# Patient Record
Sex: MALE | Race: WHITE | NOT HISPANIC OR LATINO | URBAN - METROPOLITAN AREA
[De-identification: names, ages, dates, MRNs, and addresses within clinical notes are randomized per-mention and may not be internally consistent; named-entity substitution may affect disease eponyms.]

---

## 2018-08-22 ENCOUNTER — EMERGENCY (EMERGENCY)
Facility: HOSPITAL | Age: 63
LOS: 1 days | Discharge: HOME | End: 2018-08-22
Attending: EMERGENCY MEDICINE | Admitting: EMERGENCY MEDICINE
Payer: COMMERCIAL

## 2018-08-22 VITALS
DIASTOLIC BLOOD PRESSURE: 93 MMHG | RESPIRATION RATE: 18 BRPM | SYSTOLIC BLOOD PRESSURE: 194 MMHG | OXYGEN SATURATION: 99 % | HEART RATE: 70 BPM | TEMPERATURE: 99 F

## 2018-08-22 VITALS
DIASTOLIC BLOOD PRESSURE: 126 MMHG | OXYGEN SATURATION: 100 % | SYSTOLIC BLOOD PRESSURE: 198 MMHG | HEART RATE: 96 BPM | TEMPERATURE: 99 F | RESPIRATION RATE: 16 BRPM

## 2018-08-22 LAB
ALBUMIN SERPL ELPH-MCNC: 4.9 G/DL — SIGNIFICANT CHANGE UP (ref 3.3–5)
ALP SERPL-CCNC: 61 U/L — SIGNIFICANT CHANGE UP (ref 40–120)
ALT FLD-CCNC: 16 U/L — SIGNIFICANT CHANGE UP (ref 4–41)
AST SERPL-CCNC: 19 U/L — SIGNIFICANT CHANGE UP (ref 4–40)
BASOPHILS # BLD AUTO: 0.03 K/UL — SIGNIFICANT CHANGE UP (ref 0–0.2)
BASOPHILS NFR BLD AUTO: 0.3 % — SIGNIFICANT CHANGE UP (ref 0–2)
BILIRUB SERPL-MCNC: 0.6 MG/DL — SIGNIFICANT CHANGE UP (ref 0.2–1.2)
BUN SERPL-MCNC: 14 MG/DL — SIGNIFICANT CHANGE UP (ref 7–23)
CALCIUM SERPL-MCNC: 9.9 MG/DL — SIGNIFICANT CHANGE UP (ref 8.4–10.5)
CHLORIDE SERPL-SCNC: 100 MMOL/L — SIGNIFICANT CHANGE UP (ref 98–107)
CO2 SERPL-SCNC: 25 MMOL/L — SIGNIFICANT CHANGE UP (ref 22–31)
CREAT SERPL-MCNC: 1.25 MG/DL — SIGNIFICANT CHANGE UP (ref 0.5–1.3)
EOSINOPHIL # BLD AUTO: 0.02 K/UL — SIGNIFICANT CHANGE UP (ref 0–0.5)
EOSINOPHIL NFR BLD AUTO: 0.2 % — SIGNIFICANT CHANGE UP (ref 0–6)
GLUCOSE SERPL-MCNC: 96 MG/DL — SIGNIFICANT CHANGE UP (ref 70–99)
HCT VFR BLD CALC: 47.8 % — SIGNIFICANT CHANGE UP (ref 39–50)
HGB BLD-MCNC: 16.5 G/DL — SIGNIFICANT CHANGE UP (ref 13–17)
IMM GRANULOCYTES # BLD AUTO: 0.03 # — SIGNIFICANT CHANGE UP
IMM GRANULOCYTES NFR BLD AUTO: 0.3 % — SIGNIFICANT CHANGE UP (ref 0–1.5)
LYMPHOCYTES # BLD AUTO: 1.51 K/UL — SIGNIFICANT CHANGE UP (ref 1–3.3)
LYMPHOCYTES # BLD AUTO: 15.4 % — SIGNIFICANT CHANGE UP (ref 13–44)
MCHC RBC-ENTMCNC: 30.4 PG — SIGNIFICANT CHANGE UP (ref 27–34)
MCHC RBC-ENTMCNC: 34.5 % — SIGNIFICANT CHANGE UP (ref 32–36)
MCV RBC AUTO: 88.2 FL — SIGNIFICANT CHANGE UP (ref 80–100)
MONOCYTES # BLD AUTO: 0.54 K/UL — SIGNIFICANT CHANGE UP (ref 0–0.9)
MONOCYTES NFR BLD AUTO: 5.5 % — SIGNIFICANT CHANGE UP (ref 2–14)
NEUTROPHILS # BLD AUTO: 7.66 K/UL — HIGH (ref 1.8–7.4)
NEUTROPHILS NFR BLD AUTO: 78.3 % — HIGH (ref 43–77)
NRBC # FLD: 0 — SIGNIFICANT CHANGE UP
PLATELET # BLD AUTO: 251 K/UL — SIGNIFICANT CHANGE UP (ref 150–400)
PMV BLD: 10.5 FL — SIGNIFICANT CHANGE UP (ref 7–13)
POTASSIUM SERPL-MCNC: 4 MMOL/L — SIGNIFICANT CHANGE UP (ref 3.5–5.3)
POTASSIUM SERPL-SCNC: 4 MMOL/L — SIGNIFICANT CHANGE UP (ref 3.5–5.3)
PROT SERPL-MCNC: 8.7 G/DL — HIGH (ref 6–8.3)
RBC # BLD: 5.42 M/UL — SIGNIFICANT CHANGE UP (ref 4.2–5.8)
RBC # FLD: 12.9 % — SIGNIFICANT CHANGE UP (ref 10.3–14.5)
SODIUM SERPL-SCNC: 140 MMOL/L — SIGNIFICANT CHANGE UP (ref 135–145)
WBC # BLD: 9.79 K/UL — SIGNIFICANT CHANGE UP (ref 3.8–10.5)
WBC # FLD AUTO: 9.79 K/UL — SIGNIFICANT CHANGE UP (ref 3.8–10.5)

## 2018-08-22 PROCEDURE — 99284 EMERGENCY DEPT VISIT MOD MDM: CPT

## 2018-08-22 RX ORDER — ACETAMINOPHEN 500 MG
650 TABLET ORAL ONCE
Qty: 0 | Refills: 0 | Status: COMPLETED | OUTPATIENT
Start: 2018-08-22 | End: 2018-08-22

## 2018-08-22 RX ORDER — LOSARTAN POTASSIUM 100 MG/1
50 TABLET, FILM COATED ORAL ONCE
Qty: 0 | Refills: 0 | Status: COMPLETED | OUTPATIENT
Start: 2018-08-22 | End: 2018-08-22

## 2018-08-22 RX ORDER — LOSARTAN POTASSIUM 100 MG/1
1 TABLET, FILM COATED ORAL
Qty: 7 | Refills: 0 | OUTPATIENT
Start: 2018-08-22 | End: 2018-08-28

## 2018-08-22 RX ADMIN — Medication 650 MILLIGRAM(S): at 18:26

## 2018-08-22 RX ADMIN — LOSARTAN POTASSIUM 50 MILLIGRAM(S): 100 TABLET, FILM COATED ORAL at 18:25

## 2018-08-22 NOTE — ED PROVIDER NOTE - PLAN OF CARE
1. Follow up with your primary care physician within 2-3days for reevaluation.  2.  Return to the Emergency Department for worsening, progressive or any other concerning symptoms.  3.  Take medications as prescribed.

## 2018-08-22 NOTE — ED ADULT NURSE NOTE - OBJECTIVE STATEMENT
Mihir RN: patient is a 62 y/o male a&ox4 presenting from urgent care with a c/c of high BP, sinua HA, ringing in left ear and pain in left eye.  Despite what is written in triage note patient is denying neck pain.  Patient reports seeking care at Urgent Care today due to sinus HA and was advised to seek care in the ED due to elevated BP.  At present patient is a-focal, GCS of 15, denies visual disturbance/blurry vision/diplopia.  Denies hx of medical problems denies taking medications daily.  Denies SOB, CP, N/V/D, fevers/chills, abd pain, GI/ symptoms, 18 gauge PIV placed in right ac, patient in nad, waiting to be seen by MD.

## 2018-08-22 NOTE — ED PROVIDER NOTE - CARE PLAN
Principal Discharge DX:	HTN (hypertension)  Assessment and plan of treatment:	1. Follow up with your primary care physician within 2-3days for reevaluation.  2.  Return to the Emergency Department for worsening, progressive or any other concerning symptoms.  3.  Take medications as prescribed.

## 2018-08-22 NOTE — ED ADULT TRIAGE NOTE - CHIEF COMPLAINT QUOTE
Sent from urgent care for high BP. States he went there c/o sinus pressure, left ear ringing and neck pain x 1 wk. Denies cough, congestion or fevers. BP 230s/120s at . No medical complaints. Denies cp or sob. Sent from urgent care for high BP. States he went there c/o sinus pressure, left ear ringing and neck pain x 1 wk. Denies cough, congestion or fevers. BP 230s/120s at . No known medical problems. Denies cp or sob.

## 2018-08-22 NOTE — ED ADULT NURSE NOTE - NSIMPLEMENTINTERV_GEN_ALL_ED
Implemented All Universal Safety Interventions:  [place] to call system. Call bell, personal items and telephone within reach. Instruct patient to call for assistance. Room bathroom lighting operational. Non-slip footwear when patient is off stretcher. Physically safe environment: no spills, clutter or unnecessary equipment. Stretcher in lowest position, wheels locked, appropriate side rails in place.

## 2018-08-22 NOTE — ED ADULT NURSE NOTE - CHIEF COMPLAINT QUOTE
Sent from urgent care for high BP. States he went there c/o sinus pressure, left ear ringing and neck pain x 1 wk. Denies cough, congestion or fevers. BP 230s/120s at . No known medical problems. Denies cp or sob.

## 2018-08-22 NOTE — ED PROVIDER NOTE - OBJECTIVE STATEMENT
62yo male no PMH presenting with 1 week of sinus congestion, feels like pressure across forehead, nose and left eye. No fevers or chills. No chest pain or shortness of breath. No visual changes. No coughing, no runny nose. Patient endorses L ear tinnitus. Checked BP at home today and noticed it was 200s/100s, then went to urgent care and had BP 230s/130s and was sent to ED for evaluation. Patient has been taking Tylenol only for his headache.

## 2018-08-22 NOTE — ED PROVIDER NOTE - PHYSICAL EXAMINATION
Gen: NAD  Head: NCAT  HEENT: PERRL, EOMI, oral mucosa moist, no frontal or maxillary sinus tenderness  Lung: CTAB, no respiratory distress, no wheezing, rales, rhonchi  CV: normal s1/s2, rrr, no murmurs, Normal perfusion  Abd: soft, NTND  MSK: No edema, no visible deformities, full range of motion in all 4 extremities  Neuro: No focal neurologic deficits  Skin: No rash

## 2018-08-22 NOTE — ED PROVIDER NOTE - MEDICAL DECISION MAKING DETAILS
64yo male with asymptomatic HTN, will check labs, give losartan, tx HA with tylenol, reassess. Bhavana Boothe DO

## 2018-08-22 NOTE — ED PROVIDER NOTE - ATTENDING CONTRIBUTION TO CARE
Attending note:   After face to face evaluation of this patient, I concur with above noted hx, pe, and care plan for this patient.  64 y/o M with borderline htn in past with rising bps recently with diastolics of 120 noted today.   No cp, no sob, patient with slight complaint of forehead discomfort.   /105.  Will start oral agent.

## 2018-08-24 ENCOUNTER — EMERGENCY (EMERGENCY)
Facility: HOSPITAL | Age: 63
LOS: 1 days | Discharge: HOME | End: 2018-08-24
Attending: EMERGENCY MEDICINE | Admitting: EMERGENCY MEDICINE
Payer: COMMERCIAL

## 2018-08-24 VITALS
DIASTOLIC BLOOD PRESSURE: 117 MMHG | OXYGEN SATURATION: 99 % | TEMPERATURE: 98 F | HEART RATE: 99 BPM | SYSTOLIC BLOOD PRESSURE: 179 MMHG | RESPIRATION RATE: 18 BRPM

## 2018-08-24 VITALS
SYSTOLIC BLOOD PRESSURE: 187 MMHG | HEART RATE: 75 BPM | OXYGEN SATURATION: 100 % | DIASTOLIC BLOOD PRESSURE: 91 MMHG | RESPIRATION RATE: 16 BRPM

## 2018-08-24 DIAGNOSIS — D18.01 HEMANGIOMA OF SKIN AND SUBCUTANEOUS TISSUE: ICD-10-CM

## 2018-08-24 LAB
ALBUMIN SERPL ELPH-MCNC: 4.7 G/DL — SIGNIFICANT CHANGE UP (ref 3.3–5)
ALP SERPL-CCNC: 65 U/L — SIGNIFICANT CHANGE UP (ref 40–120)
ALT FLD-CCNC: 14 U/L — SIGNIFICANT CHANGE UP (ref 4–41)
APTT BLD: 26.9 SEC — LOW (ref 27.5–37.4)
AST SERPL-CCNC: 14 U/L — SIGNIFICANT CHANGE UP (ref 4–40)
BASOPHILS # BLD AUTO: 0.02 K/UL — SIGNIFICANT CHANGE UP (ref 0–0.2)
BASOPHILS NFR BLD AUTO: 0.2 % — SIGNIFICANT CHANGE UP (ref 0–2)
BILIRUB SERPL-MCNC: 0.6 MG/DL — SIGNIFICANT CHANGE UP (ref 0.2–1.2)
BLD GP AB SCN SERPL QL: NEGATIVE — SIGNIFICANT CHANGE UP
BUN SERPL-MCNC: 13 MG/DL — SIGNIFICANT CHANGE UP (ref 7–23)
CALCIUM SERPL-MCNC: 10.2 MG/DL — SIGNIFICANT CHANGE UP (ref 8.4–10.5)
CHLORIDE SERPL-SCNC: 99 MMOL/L — SIGNIFICANT CHANGE UP (ref 98–107)
CO2 SERPL-SCNC: 25 MMOL/L — SIGNIFICANT CHANGE UP (ref 22–31)
CREAT SERPL-MCNC: 1.19 MG/DL — SIGNIFICANT CHANGE UP (ref 0.5–1.3)
CRP SERPL-MCNC: 8.1 MG/L — HIGH
EOSINOPHIL # BLD AUTO: 0.01 K/UL — SIGNIFICANT CHANGE UP (ref 0–0.5)
EOSINOPHIL NFR BLD AUTO: 0.1 % — SIGNIFICANT CHANGE UP (ref 0–6)
ERYTHROCYTE [SEDIMENTATION RATE] IN BLOOD: 7 MM/HR — SIGNIFICANT CHANGE UP (ref 1–15)
GLUCOSE SERPL-MCNC: 120 MG/DL — HIGH (ref 70–99)
HCT VFR BLD CALC: 51.1 % — HIGH (ref 39–50)
HGB BLD-MCNC: 17.7 G/DL — HIGH (ref 13–17)
IMM GRANULOCYTES # BLD AUTO: 0.03 # — SIGNIFICANT CHANGE UP
IMM GRANULOCYTES NFR BLD AUTO: 0.3 % — SIGNIFICANT CHANGE UP (ref 0–1.5)
INR BLD: 1.14 — SIGNIFICANT CHANGE UP (ref 0.88–1.17)
LYMPHOCYTES # BLD AUTO: 0.95 K/UL — LOW (ref 1–3.3)
LYMPHOCYTES # BLD AUTO: 10.3 % — LOW (ref 13–44)
MAGNESIUM SERPL-MCNC: 2.3 MG/DL — SIGNIFICANT CHANGE UP (ref 1.6–2.6)
MCHC RBC-ENTMCNC: 30.2 PG — SIGNIFICANT CHANGE UP (ref 27–34)
MCHC RBC-ENTMCNC: 34.6 % — SIGNIFICANT CHANGE UP (ref 32–36)
MCV RBC AUTO: 87.2 FL — SIGNIFICANT CHANGE UP (ref 80–100)
MONOCYTES # BLD AUTO: 0.52 K/UL — SIGNIFICANT CHANGE UP (ref 0–0.9)
MONOCYTES NFR BLD AUTO: 5.7 % — SIGNIFICANT CHANGE UP (ref 2–14)
NEUTROPHILS # BLD AUTO: 7.67 K/UL — HIGH (ref 1.8–7.4)
NEUTROPHILS NFR BLD AUTO: 83.4 % — HIGH (ref 43–77)
NRBC # FLD: 0 — SIGNIFICANT CHANGE UP
PHOSPHATE SERPL-MCNC: 2.8 MG/DL — SIGNIFICANT CHANGE UP (ref 2.5–4.5)
PLATELET # BLD AUTO: 266 K/UL — SIGNIFICANT CHANGE UP (ref 150–400)
PMV BLD: 10.1 FL — SIGNIFICANT CHANGE UP (ref 7–13)
POTASSIUM SERPL-MCNC: 4.3 MMOL/L — SIGNIFICANT CHANGE UP (ref 3.5–5.3)
POTASSIUM SERPL-SCNC: 4.3 MMOL/L — SIGNIFICANT CHANGE UP (ref 3.5–5.3)
PROT SERPL-MCNC: 8.3 G/DL — SIGNIFICANT CHANGE UP (ref 6–8.3)
PROTHROM AB SERPL-ACNC: 12.7 SEC — SIGNIFICANT CHANGE UP (ref 9.8–13.1)
RBC # BLD: 5.86 M/UL — HIGH (ref 4.2–5.8)
RBC # FLD: 12.8 % — SIGNIFICANT CHANGE UP (ref 10.3–14.5)
RH IG SCN BLD-IMP: POSITIVE — SIGNIFICANT CHANGE UP
SODIUM SERPL-SCNC: 136 MMOL/L — SIGNIFICANT CHANGE UP (ref 135–145)
TROPONIN T, HIGH SENSITIVITY: 8 NG/L — SIGNIFICANT CHANGE UP (ref ?–14)
TROPONIN T, HIGH SENSITIVITY: 8 NG/L — SIGNIFICANT CHANGE UP (ref ?–14)
WBC # BLD: 9.2 K/UL — SIGNIFICANT CHANGE UP (ref 3.8–10.5)
WBC # FLD AUTO: 9.2 K/UL — SIGNIFICANT CHANGE UP (ref 3.8–10.5)

## 2018-08-24 PROCEDURE — 70553 MRI BRAIN STEM W/O & W/DYE: CPT | Mod: 26

## 2018-08-24 PROCEDURE — 99285 EMERGENCY DEPT VISIT HI MDM: CPT | Mod: 25

## 2018-08-24 PROCEDURE — 70450 CT HEAD/BRAIN W/O DYE: CPT | Mod: 26

## 2018-08-24 PROCEDURE — 99281 EMR DPT VST MAYX REQ PHY/QHP: CPT

## 2018-08-24 RX ORDER — KETOROLAC TROMETHAMINE 30 MG/ML
15 SYRINGE (ML) INJECTION ONCE
Qty: 0 | Refills: 0 | Status: DISCONTINUED | OUTPATIENT
Start: 2018-08-24 | End: 2018-08-24

## 2018-08-24 RX ORDER — SODIUM CHLORIDE 9 MG/ML
1000 INJECTION INTRAMUSCULAR; INTRAVENOUS; SUBCUTANEOUS ONCE
Qty: 0 | Refills: 0 | Status: COMPLETED | OUTPATIENT
Start: 2018-08-24 | End: 2018-08-24

## 2018-08-24 RX ORDER — LABETALOL HCL 100 MG
10 TABLET ORAL ONCE
Qty: 0 | Refills: 0 | Status: COMPLETED | OUTPATIENT
Start: 2018-08-24 | End: 2018-08-24

## 2018-08-24 RX ORDER — ACETAMINOPHEN 500 MG
975 TABLET ORAL ONCE
Qty: 0 | Refills: 0 | Status: COMPLETED | OUTPATIENT
Start: 2018-08-24 | End: 2018-08-24

## 2018-08-24 RX ORDER — METOCLOPRAMIDE HCL 10 MG
10 TABLET ORAL ONCE
Qty: 0 | Refills: 0 | Status: COMPLETED | OUTPATIENT
Start: 2018-08-24 | End: 2018-08-24

## 2018-08-24 RX ORDER — DIPHENHYDRAMINE HCL 50 MG
25 CAPSULE ORAL ONCE
Qty: 0 | Refills: 0 | Status: COMPLETED | OUTPATIENT
Start: 2018-08-24 | End: 2018-08-24

## 2018-08-24 RX ADMIN — Medication 25 MILLIGRAM(S): at 09:43

## 2018-08-24 RX ADMIN — Medication 10 MILLIGRAM(S): at 11:10

## 2018-08-24 RX ADMIN — Medication 15 MILLIGRAM(S): at 09:46

## 2018-08-24 RX ADMIN — Medication 15 MILLIGRAM(S): at 17:37

## 2018-08-24 RX ADMIN — Medication 15 MILLIGRAM(S): at 15:40

## 2018-08-24 RX ADMIN — SODIUM CHLORIDE 1000 MILLILITER(S): 9 INJECTION INTRAMUSCULAR; INTRAVENOUS; SUBCUTANEOUS at 11:00

## 2018-08-24 RX ADMIN — SODIUM CHLORIDE 1000 MILLILITER(S): 9 INJECTION INTRAMUSCULAR; INTRAVENOUS; SUBCUTANEOUS at 17:00

## 2018-08-24 RX ADMIN — Medication 15 MILLIGRAM(S): at 10:49

## 2018-08-24 RX ADMIN — Medication 975 MILLIGRAM(S): at 19:24

## 2018-08-24 RX ADMIN — Medication 10 MILLIGRAM(S): at 15:40

## 2018-08-24 RX ADMIN — Medication 975 MILLIGRAM(S): at 18:39

## 2018-08-24 RX ADMIN — SODIUM CHLORIDE 1000 MILLILITER(S): 9 INJECTION INTRAMUSCULAR; INTRAVENOUS; SUBCUTANEOUS at 15:40

## 2018-08-24 RX ADMIN — Medication 10 MILLIGRAM(S): at 09:51

## 2018-08-24 RX ADMIN — SODIUM CHLORIDE 1000 MILLILITER(S): 9 INJECTION INTRAMUSCULAR; INTRAVENOUS; SUBCUTANEOUS at 09:56

## 2018-08-24 NOTE — ED SUB INTERN NOTE - ENMT, MLM
Airway patent. Nasal mucosa clear. Mouth with normal mucosa. Throat has no vesicles, no oropharyngeal exudates and uvula is midline. No TTP over maxillary or frontal sinuses.

## 2018-08-24 NOTE — ED PROVIDER NOTE - PHYSICAL EXAMINATION
GEN: NAD, A & O x 3  HEAD/EYES: NCAT, PERRL, EOMI, anicteric sclerae, no conjunctival pallor, no ttp over temporal region, no jaw tenderness  ENT: mucus membranes moist, oropharynx WNL, trachea midline, no JVD, TM's clear b/l  RESP: lungs CTA with equal breath sounds bilaterally, chest wall nontender and atraumatic  CV: heart with reg rhythm S1, S2, no murmur; distal pulses intact and symmetric bilaterally  ABDOMEN: normoactive bowel sounds, soft, nondistended, nontender, no palpable masses  : no CVAT  MSK: extremities atraumatic and nontender, no edema, no asymmetry. the back is without midline or lateral tenderness, there is no spinal deformity or stepoff and the back is ranged painlessly. the neck has no midline tenderness, deformity, or stepoff, and is ranged painlessly.  SKIN: warm, dry, no rash, no bruising, no cyanosis. color appropriate for ethnicity  PSYCH: Affect appropriate

## 2018-08-24 NOTE — CONSULT NOTE ADULT - SUBJECTIVE AND OBJECTIVE BOX
64 YO male presents to ED with intermittent headaches X 2 weeks. No specific precipitating factors, headaches predominantly left sided and there have been no visual disturbances, or focal motor or sensory loss. Headaches sometimes improved with a hot shower    WDWN male in NAD  Vital Signs Last 24 Hrs  T(C): 37.1 (24 Aug 2018 13:35), Max: 37.1 (24 Aug 2018 13:35)  T(F): 98.8 (24 Aug 2018 13:35), Max: 98.8 (24 Aug 2018 13:35)  HR: 75 (24 Aug 2018 17:04) (65 - 99)  BP: 187/91 (24 Aug 2018 17:04) (168/94 - 213/103)  BP(mean): --  RR: 16 (24 Aug 2018 17:04) (16 - 18)  SpO2: 100% (24 Aug 2018 17:04) (99% - 100%)    AAO X 3  PERRLA, EOMI  CN 2-12 grossly intact  VILLA strength 5/5  SILT  Gait normal    < from: CT Head No Cont (08.24.18 @ 09:24) >  There is a well-circumscribed hyperdensity along the undersurface of the   corpus callosum on the right at the junction of the callosal body and   splenium measuring approximately 1.1 x 0.8 x 1.0 cm. There is no   significant parenchymal vasogenic edema. The lesion likely protrudes into   the right lateral ventricle. Findings may represent an incidental   cavernoma though a small parenchymal hemorrhage versus mass is not   excluded. Contrast-enhanced MR imaging is recommended for further   evaluation.    Ventricles and sulci are otherwise intact. There is no hydrocephalus.   There is no mass effect or midline shift. No abnormal extra-axial fluid   collections are present. There is no evidence of acute transcortical   territorial infarction.    The calvarium is intact. The visualized intraorbital compartments,   paranasal sinuses and tympanomastoid cavities appear free of acute   disease.      IMPRESSION:  Hyperdensity along the undersurface of the corpus callosum as described   protruding into the right lateral ventricle which may represent an   incidental cavernoma though a small parenchymalhemorrhage versus   hyperdense mass is not excluded. Contrast-enhanced MR imaging is   recommended for further evaluation.    < end of copied text >    < from: MR Head w/wo IV Cont (08.24.18 @ 17:43) >  Redemonstration of a well-circumscribed lesion along the undersurface of   the corpus callosum on the right at the junction of the callosal body and   splenium measuring 1.0 x 0.8 x 0.8 cm, which demonstrates isointensity on   T1 and T2 with mild peripheral enhancement and does not restrict. There   is significant blooming artifact on SWI imaging. These findings is   suspicious for a type II cavernoma. Other less likely possibilities would   be primary or secondary neoplastic process.    The ventricles and sulci are within normal limits. There are no   additional areas of abnormal signal within the brain parenchyma. There is   no intraparenchymal hematoma, mass effect or midline shift. No abnormal   extra-axial fluid collections are present. There is no diffusion   abnormality to suggest acute or subacute infarction. Major flow-voids at   the base of the brain follow expected course and contour.    The calvarium is intact. The visualized intraorbital compartments,   paranasal sinuses and tympanomastoid cavities appear free of acute   disease.        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    < end of copied text >

## 2018-08-24 NOTE — ED ADULT NURSE REASSESSMENT NOTE - NS ED NURSE REASSESS COMMENT FT1
patient pain decreased from 9/10 to 5/10, pt vitals reassessed, patient is still hypertensive, md made aware, hypertensive medication orders put in, pt medicated per md order, will re assess vitals, ct performed, awaiting transport to [facility]

## 2018-08-24 NOTE — ED PROVIDER NOTE - MEDICAL DECISION MAKING DETAILS
62 yo M presenting with HA, unremarkable neurological exam, given persistence and worsening of symptoms, will CT, will tx pain and reasses, will tx sinus congestion with saline spray, ESR/CRP assess for temporal arteritis, likely dc 64 yo M presenting with HA, unremarkable neurological exam, given persistence and worsening of symptoms, will CT given waking up with headaches r/o mass, will tx pain and reasses, will tx sinus congestion with saline spray, ESR/CRP assess for temporal arteritis, likely dc

## 2018-08-24 NOTE — ED ADULT NURSE REASSESSMENT NOTE - NS ED NURSE REASSESS COMMENT FT1
patient pain greatly improved, patient had neruo surgery consult, patient given discharged papers. IV taken out in tact

## 2018-08-24 NOTE — ED ADULT NURSE REASSESSMENT NOTE - NS ED NURSE REASSESS COMMENT FT1
(Coverage RN [phone])  Pt returned from CT, resting stable.  Reports headache, 8/10.  MD Mcfadden informed.  Toradol, reglan given.  2nd Liter of NS started.  Will reassess.

## 2018-08-24 NOTE — ED ADULT NURSE NOTE - OBJECTIVE STATEMENT
patient arrived to room 15 c/o of hypertension and a 9/10 headaches since Wednesday. Patient states that he came to the ER this past Wednesday for same issue and it has not resolved, the headache has gotten worse. Pt denies any chest pain patient arrived to room 15 c/o of hypertension and a 9/10 headaches since Wednesday. Patient states that he came to the ER this past Wednesday for same issue and it has not resolved, the headache has gotten worse. Pt denies any chest pain, shortness of breath, dizziness, blurry vision, or vomiting. Pt vitals assessed and md made aware of elevated blood pressure. Pt IV placed in left arm #20G, labs drawn and sent, patient medicated per md order, patient went fot ct scan patient arrived to room 15 c/o of hypertension and a 9/10 headaches since Wednesday. Patient states that he came to the ER this past Wednesday for same issue and it has not resolved, the headache has gotten worse. Pt denies any chest pain, shortness of breath, dizziness, blurry vision,. Pt vitals assessed and md made aware of elevated blood pressure. Pt IV placed in left arm #20G, labs drawn and sent, patient medicated per md order, patient went fot ct scan

## 2018-08-24 NOTE — ED PROVIDER NOTE - OBJECTIVE STATEMENT
64 yo M recently diagnosed with HTN and started on losartan a few days ago, presenting continued pressure like HA behind eyes radiating to neck with associated sinus congestion, pain behind L eye and now with ringing in L ear. Pt taking Tylenol at home every 4 hours with pain which improves the pain slightly but always returns, he also notes improvement in symptoms when in steaming hot shower. Wakes from sleep 2/2 pain, notes always on his L side which he sleeps on. Notes pain over the temporal region. One episode nausea, non-bilious vomiting this morning that he attributes to pain. Denies visual changes, no episodes of vision loss. Denies chest pain/sob. Never smoker, no ETOH use, no history of stroke. Pt notes increased stressors at work over the last few weeks. Of note, he does suffer from headaches that usually present similarly, but never this bad, and never this prolonged. He also reports history of sinus congestion but does not typically take medications for it.

## 2018-08-24 NOTE — ED ADULT NURSE REASSESSMENT NOTE - NS ED NURSE REASSESS COMMENT FT1
patient received MRI results from md, awaiting neruo-surgery consult. Patient has pain scale 6/10 md made aware, patient medicated as per md orders, will re assess pain status

## 2018-08-24 NOTE — ED ADULT NURSE REASSESSMENT NOTE - NS ED NURSE REASSESS COMMENT FT1
patient arrived back from MRI, patient vitals assessed, md made aware of his blood pressure, md states she's fine with his current pressure, awaiting MRI results, no complaints at this time

## 2018-08-24 NOTE — ED ADULT TRIAGE NOTE - CHIEF COMPLAINT QUOTE
headache/vomiting/ high bp    pt was seen Wednesday for headache and high bp... started on losartan... today still has headache, vomited x1, bp still high...179/117.  states flet better after vomiting.  c/o ringing left ear, pressure behind eyes...mostly left side. did not take losartan this am.  denies cp, sob, dizziness or lightheadedness.

## 2018-08-24 NOTE — ED SUB INTERN NOTE - OBJECTIVE STATEMENT FT
63 y.o. male with hx of HTN presents to the ED for 2 days of worsening headache, associated with ringing in left ear, nausea, and NBNB emesis x1. Patient was last seen here 2 days ago for the same complaint and was prescribed losartan. Headache is located over the left eye and worsens with left eye abduction. Patient endorses that headache also awakens him from sleep. Over the last week, he reports feeling sinus congestion. Denies sick contacts. He has tried Tylenol with relief. Denies any fever, chills, chest pain, shortness of breath, abdominal pain, leg pain or swelling, numbness, or tingling. Patient did not take home dose of losartan (50mg) this morning.

## 2018-08-24 NOTE — ED SUB INTERN NOTE - ENMT NEGATIVE STATEMENT, MLM
Ears: (+) ringing in left ear. no ear pain and no hearing problems.Nose: no nasal congestion and no nasal drainage.Mouth/Throat: no dysphagia, no hoarseness and no throat pain.Neck: no lumps, no pain, no stiffness and no swollen glands.

## 2018-08-24 NOTE — ED PROVIDER NOTE - ATTENDING CONTRIBUTION TO CARE
63M o/w healthy presents with headache. Reports HA has been ongoing for two weeks. Does report some sinus congestion, worse on the L. Describes  HA over L eye, worse w movement of eye, no vision change. Also w L tinnitus, non-pulsatile. No fever. No dizziness. Seen in ED 2d ago for HA and HTN, had basic labs that were normal and was started on losartan.  No CP/SOB. On exam well appearing, NAD, PERRL, EOMI, no ttp over temporal artery, TM clear, mmm, lungs CTAB, RRR, abdomen soft NT/ND, no edema,  2+ pulses b/l, neuro A&Ox3, no focal deficits, skin warm and dry, no rash. CT with evidence of cavernoma vs hemorrhage vs mass. Plan for MRI and neurosurgery evaluation.  Given medication for BP since possible hemorrhage

## 2018-08-24 NOTE — ED PROVIDER NOTE - NS ED ROS FT
CONST: no fevers, no chills, no trauma  EYES: no pain, no visual disturbances  ENT: no sore throat, no epistaxis, no rhinorrhea, no hearing changes, + tinnitus  CV: no chest pain, no palpitations, no orthopnea, no extremity pain or swelling  RESP: no shortness of breath, no cough, no sputum, no pleurisy, no wheezing  ABD: no abdominal pain, no nausea, no vomiting, no diarrhea, no black or bloody stool  : no dysuria, no hematuria, no frequency, no urgency  MSK: no back pain, no neck pain, no extremity pain  NEURO: + headache, no sensory disturbances, no focal weakness, no dizziness  HEME: no easy bleeding or bruising  SKIN: no diaphoresis, no rash

## 2020-10-13 NOTE — ED PROVIDER NOTE - ENMT NEGATIVE STATEMENT, MLM
Bed: 41  Expected date: 10/13/20  Expected time: 5:32 PM  Means of arrival:   Comments:  Walkback- chest pain   Ears: no ear pain and no hearing problems.Nose: no nasal congestion and no nasal drainage.Mouth/Throat: no dysphagia, no hoarseness and no throat pain.Neck: no lumps, no pain, no stiffness and no swollen glands.

## 2022-02-23 NOTE — ED ADULT NURSE NOTE - PAIN RATING/NUMBER SCALE (0-10): ACTIVITY
9 W Plasty Text: The lesion was extirpated to the level of the fat with a #15c scalpel blade.  Given the location of the defect, shape of the defect and the proximity to free margins a W-plasty was deemed most appropriate for repair.  Using a sterile surgical marker, the appropriate transposition arms of the W-plasty were drawn incorporating the defect and placing the expected incisions within the relaxed skin tension lines where possible.    The area thus outlined was incised deep to adipose tissue with a #15 scalpel blade.  The skin margins were undermined to an appropriate distance in all directions utilizing iris scissors.  The opposing transposition arms were then transposed into place in opposite direction and anchored with interrupted buried subcutaneous sutures.

## 2022-03-14 NOTE — ED ADULT TRIAGE NOTE - TEMPERATURE IN FAHRENHEIT (DEGREES F)
Patient left voicemail for results call back. Patient call was returned, left voicemail stating what Praneeth Cargo stated below. Please call office to set up appointment week of 3/21/2022 to review CT scan and discuss options.     Please set up appointment for patient    ERNESTINE 99.1

## 2022-07-24 NOTE — ED SUB INTERN NOTE - SKIN NEGATIVE STATEMENT, MLM
BIBEMS for erratic behavior in public. Pt is agitated, screaming, attempting to abscond.  Pt required Ativan to relieve agitation.  I spoke to pt's wife who states pt has hx of cocaine and heroine abuse. no abrasions, no jaundice, no lesions, no pruritis, and no rashes.

## 2023-08-18 NOTE — ED ADULT NURSE NOTE - PAIN: BODY LOCATION
Recent Results (from the past 168 hour(s))   CBC auto differential    Collection Time: 08/17/23  7:07 PM   Result Value Ref Range    WBC 6.07 3.90 - 12.70 K/uL    RBC 2.91 (L) 4.60 - 6.20 M/uL    Hemoglobin 9.2 (L) 14.0 - 18.0 g/dL    Hematocrit 27.7 (L) 40.0 - 54.0 %    MCV 95 82 - 98 fL    MCH 31.6 (H) 27.0 - 31.0 pg    MCHC 33.2 32.0 - 36.0 g/dL    RDW 12.4 11.5 - 14.5 %    Platelets 142 (L) 150 - 450 K/uL    MPV 10.6 9.2 - 12.9 fL    Immature Granulocytes 0.2 0.0 - 0.5 %    Gran # (ANC) 3.5 1.8 - 7.7 K/uL    Immature Grans (Abs) 0.01 0.00 - 0.04 K/uL    Lymph # 1.7 1.0 - 4.8 K/uL    Mono # 0.6 0.3 - 1.0 K/uL    Eos # 0.3 0.0 - 0.5 K/uL    Baso # 0.03 0.00 - 0.20 K/uL    nRBC 0 0 /100 WBC    Gran % 57.0 38.0 - 73.0 %    Lymph % 27.3 18.0 - 48.0 %    Mono % 9.7 4.0 - 15.0 %    Eosinophil % 5.3 0.0 - 8.0 %    Basophil % 0.5 0.0 - 1.9 %    Differential Method Automated    Comprehensive metabolic panel    Collection Time: 08/17/23  7:07 PM   Result Value Ref Range    Sodium 136 136 - 145 mmol/L    Potassium 5.4 (H) 3.5 - 5.1 mmol/L    Chloride 107 95 - 110 mmol/L    CO2 19 (L) 23 - 29 mmol/L    Glucose 80 70 - 110 mg/dL    BUN 66 (H) 8 - 23 mg/dL    Creatinine 5.6 (H) 0.5 - 1.4 mg/dL    Calcium 8.9 8.7 - 10.5 mg/dL    Total Protein 7.1 6.0 - 8.4 g/dL    Albumin 3.8 3.5 - 5.2 g/dL    Total Bilirubin 0.5 0.1 - 1.0 mg/dL    Alkaline Phosphatase 32 (L) 55 - 135 U/L    AST 13 10 - 40 U/L    ALT 8 (L) 10 - 44 U/L    eGFR 9.2 (A) >60 mL/min/1.73 m^2    Anion Gap 10 8 - 16 mmol/L   Magnesium    Collection Time: 08/17/23  7:07 PM   Result Value Ref Range    Magnesium 1.5 (L) 1.6 - 2.6 mg/dL   Urinalysis, Reflex to Urine Culture Urine, Clean Catch    Collection Time: 08/17/23  8:11 PM    Specimen: Urine   Result Value Ref Range    Specimen UA Urine, Clean Catch     Color, UA Straw Yellow, Straw, Pao    Appearance, UA Clear Clear    pH, UA 6.0 5.0 - 8.0    Specific Gravity, UA 1.010 1.005 - 1.030    Protein, UA 2+ (A)  Negative    Glucose, UA Negative Negative    Ketones, UA Negative Negative    Bilirubin (UA) Negative Negative    Occult Blood UA Negative Negative    Nitrite, UA Negative Negative    Leukocytes, UA Negative Negative   Urinalysis Microscopic    Collection Time: 08/17/23  8:11 PM   Result Value Ref Range    RBC, UA 1 0 - 4 /hpf    WBC, UA 1 0 - 5 /hpf    Bacteria Rare None-Occ /hpf    Squam Epithel, UA 0 /hpf    Hyaline Casts, UA 1 0-1/lpf /lpf    Microscopic Comment SEE COMMENT        temporal region

## 2023-08-23 NOTE — ED ADULT NURSE NOTE - CAS TRG GEN SKIN COLOR
Normal for race Soolantra Pregnancy And Lactation Text: This medication is Pregnancy Category C. This medication is considered safe during breast feeding.